# Patient Record
Sex: MALE | Race: WHITE | NOT HISPANIC OR LATINO | Employment: FULL TIME | URBAN - METROPOLITAN AREA
[De-identification: names, ages, dates, MRNs, and addresses within clinical notes are randomized per-mention and may not be internally consistent; named-entity substitution may affect disease eponyms.]

---

## 2022-05-26 LAB — HCV AB SER-ACNC: NORMAL

## 2022-11-04 ENCOUNTER — OFFICE VISIT (OUTPATIENT)
Dept: GASTROENTEROLOGY | Facility: CLINIC | Age: 37
End: 2022-11-04

## 2022-11-04 ENCOUNTER — TELEPHONE (OUTPATIENT)
Dept: GASTROENTEROLOGY | Facility: CLINIC | Age: 37
End: 2022-11-04

## 2022-11-04 VITALS
SYSTOLIC BLOOD PRESSURE: 124 MMHG | HEIGHT: 67 IN | BODY MASS INDEX: 29.95 KG/M2 | WEIGHT: 190.8 LBS | DIASTOLIC BLOOD PRESSURE: 86 MMHG

## 2022-11-04 DIAGNOSIS — R10.13 EPIGASTRIC ABDOMINAL PAIN: ICD-10-CM

## 2022-11-04 DIAGNOSIS — R79.89 ELEVATED LFTS: Primary | ICD-10-CM

## 2022-11-04 NOTE — PROGRESS NOTES
[id number] - Outpatient Consultation  Alberto Gutierrez 30 y o  male MRN: [id number]  Encounter: [id number]    ASSESSMENT AND PLAN:      1  Elevated LFTs  On 11/03/2022 patient noted to have a total bilirubin of 1 4 with a direct bilirubin of 0 2  Alk-phos of 73  AST of 29 ALT of 68  Normal SPEP  TSH  LKM 1 antibody  Ceruloplasmin  Anti smooth muscle antibody  A1AT  Hepatitis a antibody, hepatitis-B surface antigen, hepatitis-B core antibody, hepatitis-C antibody  Positive hepatitis-B surface antibody    Patient with a total cholesterol of 230  LDL of 157  Triglycerides of 128  Iron studies were notable for a ferritin of 423  Iron saturation of 29  TIBC of 315  Total iron of 92  Abdominal ultrasound was done on 06/03/2022 that was normal     Elevated LFTs likely in the setting of nonalcoholic fatty liver disease  We will rule out celiac disease and autoimmune with EDMUNDO and celiac panel  Rest of workup was largely unremarkable  We would recommend lipid lowering therapy but the patient wants to try to lose weight on his own without being on chronic therapy  We recommended weight loss of about 5-7% of his body weight  We recommend refraining from heavy alcohol consumption    We will obtain LFTs in 3 and 6 months after with implementation of lifestyle interventions for weight loss  He may need to start a statin therapy if lipids and LFTs do not improve  - EDMUNDO w/Reflex; Future  - Celiac Disease Panel; Future  - Ferritin; Future  - Ferritin  - Hepatic function panel; Future  - Hepatic function panel    2  Epigastric abdominal pain  Patient with an epigastric soreness with radiation to the back and right upper quadrant  Abdominal ultrasound unremarkable  Does have persistently elevated LFTs  Can be in the setting of reflux disease  Differential also includes pancreatic disease versus BUCKLEY    Will evaluate with EGD to rule out any ulcer disease or erosive disease  Will also check a lipase  May need to get CT imaging if everything is negative  - EGD; Future  - Lipase; Future      Follow up Appointment:  3 months    Chief Complaint   Patient presents with   • Elevated Hepatic Enzymes       HPI:   29-year-old male no significant past medical history who is presenting as a consultation from PCP regarding elevated LFTs and abdominal pain  Patient states he had about a year ago he started having epigastric soreness on right upper quadrant abdominal pain with radiation to the back  Also noticed a burning heartburn sensation occasionally that is spikes  Tried Prilosec before in the past that prevented this bites but did have underlying epigastric soreness the whole time  Currently not taking medications  He did have COVID around the time  He dropped about 20 lb with a keto diet which did not really change his abdominal pain  He does notice that the pain can spike after eating  He denies any fevers or chills  No nausea or vomiting  No dysphagia  Denies any changes in bowel movements with constipation or diarrhea  No rectal bleeding or melena  No unintentional weight loss  Patient denies any jaundice or scleral icterus  No recent travel  Denies regular alcohol use  Denies any IV drug use  No history of blood transfusions  No tattoos  Denies any over-the-counter medicines with NSAIDs or herbal supplementation  No family history of liver cancer or liver diseases  Denies any family history of colon cancer  On 11/03/2022 patient noted to have a total bilirubin of 1 4 with a direct bilirubin of 0 2  Alk-phos of 73  AST of 29 ALT of 68  Normal SPEP  TSH  LKM 1 antibody  Ceruloplasmin  Anti smooth muscle antibody  A1AT  Hepatitis a antibody, hepatitis-B surface antigen, hepatitis-B core antibody, hepatitis-C antibody  Positive hepatitis-B surface antibody    Patient with a total cholesterol of 230  LDL of 157    Triglycerides of 128     Iron studies were notable for a ferritin of 423  Iron saturation of 29  TIBC of 315  Total iron of 92  Abdominal ultrasound was done on 06/03/2022 that was normal     Historical Information   History reviewed  No pertinent past medical history  Past Surgical History:   Procedure Laterality Date   • ELBOW SURGERY       Social History     Substance and Sexual Activity   Alcohol Use Yes    Comment: a couple times a year     Social History     Substance and Sexual Activity   Drug Use Not on file     Social History     Tobacco Use   Smoking Status Never Smoker   Smokeless Tobacco Never Used     Family History   Problem Relation Age of Onset   • Colon polyps Mother    • No Known Problems Father    • Colon cancer Neg Hx        Meds/Allergies   No current outpatient medications on file  No Known Allergies    PHYSICAL EXAM:    Blood pressure 124/86, height 5' 7" (1 702 m), weight 86 5 kg (190 lb 12 8 oz)  Body mass index is 29 88 kg/m²  General Appearance: NAD, cooperative, alert  Eyes: Anicteric, EOMI  ENT:  Normocephalic, atraumatic  Neck:  Supple, symmetrical, trachea midline  Resp:  Air entry present bilaterally  CV: S1, S2 present    GI:  Soft, non-tender, non-distended; normal bowel sounds; no masses, no organomegaly   Rectal: Deferred  Musculoskeletal: No cyanosis, clubbing or edema  Skin:  No jaundice, rashes, or lesions   Psych: Normal affect, good eye contact  Neuro: No gross deficits    Lab Results:   No results found for: WBC, HGB, MCV, PLT, INR  No results found for: NA, K, CL, CO2, ANIONGAP, BUN, CREATININE, GLUCOSE, GLUF, CALCIUM, CORRECTEDCA, AST, ALT, ALKPHOS, PROT, BILITOT, EGFR  No results found for: IRON, TIBC, FERRITIN  No results found for: LIPASE    Radiology Results:   No results found  Cimzia Counseling:  I discussed with the patient the risks of Cimzia including but not limited to immunosuppression, allergic reactions and infections.  The patient understands that monitoring is required including a PPD at baseline and must alert us or the primary physician if symptoms of infection or other concerning signs are noted.

## 2022-11-04 NOTE — H&P (VIEW-ONLY)
[id number] - Outpatient Consultation  Hailey Tripp 30 y o  male MRN: [id number]  Encounter: [id number]    ASSESSMENT AND PLAN:      1  Elevated LFTs  On 11/03/2022 patient noted to have a total bilirubin of 1 4 with a direct bilirubin of 0 2  Alk-phos of 73  AST of 29 ALT of 68  Normal SPEP  TSH  LKM 1 antibody  Ceruloplasmin  Anti smooth muscle antibody  A1AT  Hepatitis a antibody, hepatitis-B surface antigen, hepatitis-B core antibody, hepatitis-C antibody  Positive hepatitis-B surface antibody    Patient with a total cholesterol of 230  LDL of 157  Triglycerides of 128  Iron studies were notable for a ferritin of 423  Iron saturation of 29  TIBC of 315  Total iron of 92  Abdominal ultrasound was done on 06/03/2022 that was normal     Elevated LFTs likely in the setting of nonalcoholic fatty liver disease  We will rule out celiac disease and autoimmune with YOLANDE and celiac panel  Rest of workup was largely unremarkable  We would recommend lipid lowering therapy but the patient wants to try to lose weight on his own without being on chronic therapy  We recommended weight loss of about 5-7% of his body weight  We recommend refraining from heavy alcohol consumption    We will obtain LFTs in 3 and 6 months after with implementation of lifestyle interventions for weight loss  He may need to start a statin therapy if lipids and LFTs do not improve  - YOLANDE w/Reflex; Future  - Celiac Disease Panel; Future  - Ferritin; Future  - Ferritin  - Hepatic function panel; Future  - Hepatic function panel    2  Epigastric abdominal pain  Patient with an epigastric soreness with radiation to the back and right upper quadrant  Abdominal ultrasound unremarkable  Does have persistently elevated LFTs  Can be in the setting of reflux disease  Differential also includes pancreatic disease versus LEDESMA    Will evaluate with EGD to rule out any ulcer disease or erosive disease  Will also check a lipase  May need to get CT imaging if everything is negative  - EGD; Future  - Lipase; Future      Follow up Appointment:  3 months    Chief Complaint   Patient presents with   • Elevated Hepatic Enzymes       HPI:   61-year-old male no significant past medical history who is presenting as a consultation from PCP regarding elevated LFTs and abdominal pain  Patient states he had about a year ago he started having epigastric soreness on right upper quadrant abdominal pain with radiation to the back  Also noticed a burning heartburn sensation occasionally that is spikes  Tried Prilosec before in the past that prevented this bites but did have underlying epigastric soreness the whole time  Currently not taking medications  He did have COVID around the time  He dropped about 20 lb with a keto diet which did not really change his abdominal pain  He does notice that the pain can spike after eating  He denies any fevers or chills  No nausea or vomiting  No dysphagia  Denies any changes in bowel movements with constipation or diarrhea  No rectal bleeding or melena  No unintentional weight loss  Patient denies any jaundice or scleral icterus  No recent travel  Denies regular alcohol use  Denies any IV drug use  No history of blood transfusions  No tattoos  Denies any over-the-counter medicines with NSAIDs or herbal supplementation  No family history of liver cancer or liver diseases  Denies any family history of colon cancer  On 11/03/2022 patient noted to have a total bilirubin of 1 4 with a direct bilirubin of 0 2  Alk-phos of 73  AST of 29 ALT of 68  Normal SPEP  TSH  LKM 1 antibody  Ceruloplasmin  Anti smooth muscle antibody  A1AT  Hepatitis a antibody, hepatitis-B surface antigen, hepatitis-B core antibody, hepatitis-C antibody  Positive hepatitis-B surface antibody    Patient with a total cholesterol of 230  LDL of 157    Triglycerides of 128     Iron studies were notable for a ferritin of 423  Iron saturation of 29  TIBC of 315  Total iron of 92  Abdominal ultrasound was done on 06/03/2022 that was normal     Historical Information   History reviewed  No pertinent past medical history  Past Surgical History:   Procedure Laterality Date   • ELBOW SURGERY       Social History     Substance and Sexual Activity   Alcohol Use Yes    Comment: a couple times a year     Social History     Substance and Sexual Activity   Drug Use Not on file     Social History     Tobacco Use   Smoking Status Never Smoker   Smokeless Tobacco Never Used     Family History   Problem Relation Age of Onset   • Colon polyps Mother    • No Known Problems Father    • Colon cancer Neg Hx        Meds/Allergies   No current outpatient medications on file  No Known Allergies    PHYSICAL EXAM:    Blood pressure 124/86, height 5' 7" (1 702 m), weight 86 5 kg (190 lb 12 8 oz)  Body mass index is 29 88 kg/m²  General Appearance: NAD, cooperative, alert  Eyes: Anicteric, EOMI  ENT:  Normocephalic, atraumatic  Neck:  Supple, symmetrical, trachea midline  Resp:  Air entry present bilaterally  CV: S1, S2 present    GI:  Soft, non-tender, non-distended; normal bowel sounds; no masses, no organomegaly   Rectal: Deferred  Musculoskeletal: No cyanosis, clubbing or edema  Skin:  No jaundice, rashes, or lesions   Psych: Normal affect, good eye contact  Neuro: No gross deficits    Lab Results:   No results found for: WBC, HGB, MCV, PLT, INR  No results found for: NA, K, CL, CO2, ANIONGAP, BUN, CREATININE, GLUCOSE, GLUF, CALCIUM, CORRECTEDCA, AST, ALT, ALKPHOS, PROT, BILITOT, EGFR  No results found for: IRON, TIBC, FERRITIN  No results found for: LIPASE    Radiology Results:   No results found

## 2022-11-04 NOTE — TELEPHONE ENCOUNTER
Scheduled date of EGD(as of today):12/13/22  Physician performing EGD:Dr Crow Sherwood  Location of EGD:[facility]  Instructions reviewed with patient by:Sammi TORRES  Clearances: N

## 2022-11-10 ENCOUNTER — TELEPHONE (OUTPATIENT)
Dept: GASTROENTEROLOGY | Facility: CLINIC | Age: 37
End: 2022-11-10

## 2022-11-10 NOTE — TELEPHONE ENCOUNTER
Patients GI provider:  Dr Vivian Deckre    Number to return call: [phone]    Reason for call: Pt calling to reschedule his procedure  Above is his number       Scheduled procedure/appointment date if applicable: procedure 80/21/15

## 2022-11-17 LAB — FERRITIN SERPL-MCNC: 312 NG/ML (ref 38–380)

## 2022-11-22 LAB
ANA SER QL IF: NEGATIVE
IGA SERPL-MCNC: 109 MG/DL (ref 47–310)
LIPASE SERPL-CCNC: 28 U/L (ref 7–60)
MICRODELETION SYND BLD/T FISH: NORMAL
TTG IGA SER-ACNC: <1 U/ML

## 2022-11-23 ENCOUNTER — TELEPHONE (OUTPATIENT)
Dept: GASTROENTEROLOGY | Facility: AMBULATORY SURGERY CENTER | Age: 37
End: 2022-11-23

## 2022-11-28 ENCOUNTER — ANESTHESIA EVENT (OUTPATIENT)
Dept: GASTROENTEROLOGY | Facility: AMBULATORY SURGERY CENTER | Age: 37
End: 2022-11-28

## 2022-11-28 ENCOUNTER — HOSPITAL ENCOUNTER (OUTPATIENT)
Dept: GASTROENTEROLOGY | Facility: AMBULATORY SURGERY CENTER | Age: 37
Discharge: HOME | End: 2022-11-28
Attending: INTERNAL MEDICINE

## 2022-11-28 ENCOUNTER — ANESTHESIA (OUTPATIENT)
Dept: GASTROENTEROLOGY | Facility: AMBULATORY SURGERY CENTER | Age: 37
End: 2022-11-28

## 2022-11-28 VITALS
OXYGEN SATURATION: 99 % | DIASTOLIC BLOOD PRESSURE: 73 MMHG | HEIGHT: 67 IN | WEIGHT: 188 LBS | BODY MASS INDEX: 29.51 KG/M2 | RESPIRATION RATE: 17 BRPM | HEART RATE: 62 BPM | TEMPERATURE: 97.3 F | SYSTOLIC BLOOD PRESSURE: 115 MMHG

## 2022-11-28 DIAGNOSIS — R10.13 EPIGASTRIC ABDOMINAL PAIN: ICD-10-CM

## 2022-11-28 RX ORDER — GLYCOPYRROLATE 0.2 MG/ML
INJECTION INTRAMUSCULAR; INTRAVENOUS AS NEEDED
Status: DISCONTINUED | OUTPATIENT
Start: 2022-11-28 | End: 2022-11-28

## 2022-11-28 RX ORDER — SODIUM CHLORIDE, SODIUM LACTATE, POTASSIUM CHLORIDE, CALCIUM CHLORIDE 600; 310; 30; 20 MG/100ML; MG/100ML; MG/100ML; MG/100ML
50 INJECTION, SOLUTION INTRAVENOUS CONTINUOUS
Status: DISCONTINUED | OUTPATIENT
Start: 2022-11-28 | End: 2022-12-02 | Stop reason: HOSPADM

## 2022-11-28 RX ORDER — LIDOCAINE HYDROCHLORIDE 20 MG/ML
INJECTION, SOLUTION EPIDURAL; INFILTRATION; INTRACAUDAL; PERINEURAL AS NEEDED
Status: DISCONTINUED | OUTPATIENT
Start: 2022-11-28 | End: 2022-11-28

## 2022-11-28 RX ORDER — PROPOFOL 10 MG/ML
INJECTION, EMULSION INTRAVENOUS AS NEEDED
Status: DISCONTINUED | OUTPATIENT
Start: 2022-11-28 | End: 2022-11-28

## 2022-11-28 RX ADMIN — LIDOCAINE HYDROCHLORIDE 100 MG: 20 INJECTION, SOLUTION EPIDURAL; INFILTRATION; INTRACAUDAL; PERINEURAL at 08:43

## 2022-11-28 RX ADMIN — PROPOFOL 200 MG: 10 INJECTION, EMULSION INTRAVENOUS at 08:43

## 2022-11-28 RX ADMIN — PROPOFOL 50 MG: 10 INJECTION, EMULSION INTRAVENOUS at 08:48

## 2022-11-28 RX ADMIN — GLYCOPYRROLATE 0.1 MG: 0.2 INJECTION INTRAMUSCULAR; INTRAVENOUS at 08:42

## 2022-11-28 RX ADMIN — SODIUM CHLORIDE, SODIUM LACTATE, POTASSIUM CHLORIDE, CALCIUM CHLORIDE 50 ML/HR: 600; 310; 30; 20 INJECTION, SOLUTION INTRAVENOUS at 08:23

## 2022-11-28 RX ADMIN — PROPOFOL 50 MG: 10 INJECTION, EMULSION INTRAVENOUS at 08:45

## 2022-11-28 NOTE — ANESTHESIA PREPROCEDURE EVALUATION
Procedure:  EGD    Relevant Problems   Other   (+) Epigastric abdominal pain        Physical Exam    Airway    Mallampati score: II  TM Distance: >3 FB  Neck ROM: full     Dental   No notable dental hx     Cardiovascular      Pulmonary      Other Findings        Anesthesia Plan  ASA Score- 2     Anesthesia Type- IV sedation with anesthesia with ASA Monitors  Additional Monitors:   Airway Plan:           Plan Factors-Exercise tolerance (METS): >4 METS  Chart reviewed  Patient summary reviewed  Patient is not a current smoker  Induction- intravenous  Postoperative Plan-     Informed Consent- Anesthetic plan and risks discussed with patient  I personally reviewed this patient with the CRNA  Discussed and agreed on the Anesthesia Plan with the CRNA  Charanjit Harper

## 2022-11-28 NOTE — INTERVAL H&P NOTE
H&P reviewed  After examining the patient I find no changes in the patients condition since the H&P had been written      Vitals:    11/28/22 0820   BP: 136/83   Pulse: 67   Resp: (!) 25   Temp:    SpO2: 97%

## 2022-11-28 NOTE — ANESTHESIA POSTPROCEDURE EVALUATION
Post-Op Assessment Note    CV Status:  Stable    Pain management: adequate     Mental Status:  Alert and awake   Hydration Status:  Euvolemic   PONV Controlled:  Controlled   Airway Patency:  Patent      Post Op Vitals Reviewed: Yes      Staff: Anesthesiologist, CRNA         No notable events documented      /61 (11/28/22 0854)    Temp     Pulse 67 (11/28/22 0854)   Resp 13 (11/28/22 0854)    SpO2 98 % (11/28/22 0854)

## 2023-03-09 LAB
ALBUMIN SERPL-MCNC: 4.6 G/DL (ref 3.6–5.1)
ALBUMIN/GLOB SERPL: 1.8 (CALC) (ref 1–2.5)
ALP SERPL-CCNC: 61 U/L (ref 36–130)
ALT SERPL-CCNC: 70 U/L (ref 9–46)
AST SERPL-CCNC: 30 U/L (ref 10–40)
BILIRUB DIRECT SERPL-MCNC: 0.3 MG/DL
BILIRUB INDIRECT SERPL-MCNC: 1.8 MG/DL (CALC) (ref 0.2–1.2)
BILIRUB SERPL-MCNC: 2.1 MG/DL (ref 0.2–1.2)
GLOBULIN SER CALC-MCNC: 2.5 G/DL (CALC) (ref 1.9–3.7)
PROT SERPL-MCNC: 7.1 G/DL (ref 6.1–8.1)

## 2023-03-15 ENCOUNTER — OFFICE VISIT (OUTPATIENT)
Dept: GASTROENTEROLOGY | Facility: CLINIC | Age: 38
End: 2023-03-15

## 2023-03-15 VITALS
SYSTOLIC BLOOD PRESSURE: 127 MMHG | HEIGHT: 67 IN | BODY MASS INDEX: 30.29 KG/M2 | DIASTOLIC BLOOD PRESSURE: 74 MMHG | WEIGHT: 193 LBS

## 2023-03-15 DIAGNOSIS — R10.13 EPIGASTRIC ABDOMINAL PAIN: ICD-10-CM

## 2023-03-15 DIAGNOSIS — R79.89 ELEVATED LFTS: Primary | ICD-10-CM

## 2023-03-15 NOTE — PROGRESS NOTES
[facility] - Outpatient Follow-up Note  Essence Arias 30 y o  male MRN: [id number]  Encounter: [id number]    ASSESSMENT AND PLAN:      1  Elevated LFTs  On 3/2023  Total bili of 2 1 ALT of 70  On 11/03/2022 patient noted to have a total bilirubin of 1 4 with a direct bilirubin of 0 2  Alk-phos of 73  AST of 29 ALT of 68  Negative YOLANDE, celiac, ferritin  Normal SPEP  TSH  LKM 1 antibody  Ceruloplasmin  Anti smooth muscle antibody  A1AT  Hepatitis a antibody, hepatitis-B surface antigen, hepatitis-B core antibody, hepatitis-C antibody  Positive hepatitis-B surface antibody     Patient with a total cholesterol of 230  LDL of 157  Triglycerides of 128      Abdominal ultrasound was done on 06/03/2022 that was normal      Elevated LFTs likely in the setting of nonalcoholic fatty liver disease  Rest of workup unremarkable      We recommended lipid lowering therapy but the patient wants to try to lose weight on his own without being on chronic therapy  We recommended weight loss of about 5-7% of his body weight      We recommend refraining from heavy alcohol consumption     We will obtain LFTs in 6 months after with implementation of lifestyle interventions for weight loss      We will obtain ultrasound elastography to quantify fat levels  If negative may need to consider liver biopsy  Likely has component of Gilbert's disease as well  - US elastography; Future  - Hepatic function panel; Future  - Hepatic function panel  - Lipid panel; Future  - Lipid panel  - Hemoglobin A1c (w/out EAG); Future  - Hemoglobin A1c (w/out EAG)    2  Epigastric abdominal pain  Now resolved  Likely musculoskeletal   Status post EGD that was unremarkable in November 2022        Follow up appointment: 6 months    I have spent 35 minutes in preparation and review of data today and with the patient today in which greater than 50% of this time was spent in counseling/coordination of care regarding diagnostic results, prognosis, risks and benefits of tx options, intructions for management, patient and family education  ______________________________________________________________________    Chief Complaint   Patient presents with   • Elevated Hepatic Enzymes   • Abdominal Pain   • Follow up egd     HPI:     Patient is a 81-year-old male presenting for follow-up regarding elevated LFTs and abdominal pain  Last seen in office in November 2022  Noted to have a total bilirubin of 2 1 direct bilirubin of 0 3 ALT of 70  Work-up has been largely negative  Likely in the setting of Jennifer Fableonard  He did have a upper epigastric rib line soreness likely in the setting of musculoskeletal pain that has improved after being home from work  He denies any fevers or chills  No nausea or vomiting  No dysphagia  Denies any changes in bowel movements with constipation or diarrhea  No rectal bleeding or melena  No unintentional weight loss  Patient denies any jaundice or scleral icterus  No recent travel  Denies regular alcohol use  Denies any IV drug use  No history of blood transfusions  No tattoos  Denies any over-the-counter medicines with NSAIDs or herbal supplementation  No family history of liver cancer or liver diseases  Denies any family history of colon cancer      On 3/2023  Total bili of 2 1 ALT of 70  On 11/03/2022 patient noted to have a total bilirubin of 1 4 with a direct bilirubin of 0 2  Alk-phos of 73  AST of 29 ALT of 68  Negative YOLANDE, celiac, ferritin  Normal SPEP  TSH  LKM 1 antibody  Ceruloplasmin  Anti smooth muscle antibody  A1AT  Hepatitis a antibody, hepatitis-B surface antigen, hepatitis-B core antibody, hepatitis-C antibody  Positive hepatitis-B surface antibody     Patient with a total cholesterol of 230  LDL of 157  Triglycerides of 128      Abdominal ultrasound was done on 06/03/2022 that was normal           Historical Information   History reviewed   No pertinent past medical history  Past Surgical History:   Procedure Laterality Date   • ELBOW SURGERY       Social History     Substance and Sexual Activity   Alcohol Use Yes    Comment: a couple times a year     Social History     Substance and Sexual Activity   Drug Use Never     Social History     Tobacco Use   Smoking Status Never   Smokeless Tobacco Never     Family History   Problem Relation Age of Onset   • Colon polyps Mother    • No Known Problems Father    • Colon cancer Neg Hx        No current outpatient medications on file  No Known Allergies  Reviewed medications and allergies and updated as indicated    PHYSICAL EXAM:    Blood pressure 127/74, height 5' 7" (1 702 m), weight 87 5 kg (193 lb)  Body mass index is 30 23 kg/m²  General Appearance: NAD, cooperative, alert  Eyes: Anicteric  GI:  Soft, non-tender, non-distended; normal bowel sounds; no masses, no organomegaly   Rectal: Deferred  Musculoskeletal: No edema  Skin:  No jaundice    Lab Results:   No results found for: WBC, HGB, MCV, PLT  Lab Results   Component Value Date    AST 30 03/08/2023    ALT 70 (H) 03/08/2023    ALKPHOS 61 03/08/2023     Lab Results   Component Value Date    FERRITIN 312 11/16/2022     Lab Results   Component Value Date    LIPASE 28 11/16/2022       Radiology Results:   No results found

## 2023-11-29 LAB
ALBUMIN SERPL-MCNC: 4.6 G/DL (ref 3.6–5.1)
ALBUMIN/GLOB SERPL: 1.9 (CALC) (ref 1–2.5)
ALP SERPL-CCNC: 59 U/L (ref 36–130)
ALT SERPL-CCNC: 111 U/L (ref 9–46)
AST SERPL-CCNC: 48 U/L (ref 10–40)
BILIRUB DIRECT SERPL-MCNC: 0.2 MG/DL
BILIRUB INDIRECT SERPL-MCNC: 1 MG/DL (CALC) (ref 0.2–1.2)
BILIRUB SERPL-MCNC: 1.2 MG/DL (ref 0.2–1.2)
CHOLEST SERPL-MCNC: 238 MG/DL
CHOLEST/HDLC SERPL: 4.6 (CALC)
GLOBULIN SER CALC-MCNC: 2.4 G/DL (CALC) (ref 1.9–3.7)
HBA1C MFR BLD: 5.3 % OF TOTAL HGB
HDLC SERPL-MCNC: 52 MG/DL
LDLC SERPL CALC-MCNC: 162 MG/DL (CALC)
NONHDLC SERPL-MCNC: 186 MG/DL (CALC)
PROT SERPL-MCNC: 7 G/DL (ref 6.1–8.1)
TRIGL SERPL-MCNC: 121 MG/DL